# Patient Record
Sex: MALE | Race: WHITE | NOT HISPANIC OR LATINO | Employment: OTHER | URBAN - METROPOLITAN AREA
[De-identification: names, ages, dates, MRNs, and addresses within clinical notes are randomized per-mention and may not be internally consistent; named-entity substitution may affect disease eponyms.]

---

## 2019-01-23 ENCOUNTER — HISTORICAL (OUTPATIENT)
Dept: ADMINISTRATIVE | Facility: HOSPITAL | Age: 74
End: 2019-01-23

## 2019-02-27 ENCOUNTER — HISTORICAL (OUTPATIENT)
Dept: ADMINISTRATIVE | Facility: HOSPITAL | Age: 74
End: 2019-02-27

## 2019-08-20 ENCOUNTER — HISTORICAL (OUTPATIENT)
Dept: RADIOLOGY | Facility: HOSPITAL | Age: 74
End: 2019-08-20

## 2022-04-10 ENCOUNTER — HISTORICAL (OUTPATIENT)
Dept: ADMINISTRATIVE | Facility: HOSPITAL | Age: 77
End: 2022-04-10
Payer: MEDICARE

## 2022-04-26 VITALS
BODY MASS INDEX: 25.77 KG/M2 | OXYGEN SATURATION: 98 % | DIASTOLIC BLOOD PRESSURE: 67 MMHG | WEIGHT: 184.06 LBS | SYSTOLIC BLOOD PRESSURE: 101 MMHG | HEIGHT: 71 IN

## 2022-05-04 PROBLEM — L40.9 PSORIASIS: Status: ACTIVE | Noted: 2022-05-04

## 2022-05-04 PROBLEM — I83.90 VARICOSE VEINS OF LOWER EXTREMITY: Status: ACTIVE | Noted: 2022-05-04

## 2022-05-04 PROBLEM — E78.2 MIXED HYPERLIPIDEMIA: Status: ACTIVE | Noted: 2022-05-04

## 2022-05-04 PROBLEM — I10 PRIMARY HYPERTENSION: Status: ACTIVE | Noted: 2022-05-04

## 2022-05-04 PROBLEM — R73.03 PREDIABETES: Status: ACTIVE | Noted: 2022-05-04

## 2022-05-05 ENCOUNTER — OFFICE VISIT (OUTPATIENT)
Dept: PRIMARY CARE CLINIC | Facility: CLINIC | Age: 77
End: 2022-05-05
Payer: MEDICARE

## 2022-05-05 VITALS
OXYGEN SATURATION: 96 % | WEIGHT: 198.44 LBS | DIASTOLIC BLOOD PRESSURE: 72 MMHG | HEIGHT: 70 IN | BODY MASS INDEX: 28.41 KG/M2 | SYSTOLIC BLOOD PRESSURE: 112 MMHG | RESPIRATION RATE: 18 BRPM | HEART RATE: 90 BPM | TEMPERATURE: 97 F

## 2022-05-05 DIAGNOSIS — I10 PRIMARY HYPERTENSION: ICD-10-CM

## 2022-05-05 DIAGNOSIS — Z00.00 WELLNESS EXAMINATION: Primary | ICD-10-CM

## 2022-05-05 DIAGNOSIS — E78.2 MIXED HYPERLIPIDEMIA: ICD-10-CM

## 2022-05-05 DIAGNOSIS — Z12.5 SCREENING FOR PROSTATE CANCER: ICD-10-CM

## 2022-05-05 DIAGNOSIS — D72.819 LEUKOPENIA, UNSPECIFIED TYPE: ICD-10-CM

## 2022-05-05 DIAGNOSIS — R73.03 PREDIABETES: ICD-10-CM

## 2022-05-05 DIAGNOSIS — I83.90 VARICOSE VEINS OF LOWER EXTREMITY, UNSPECIFIED LATERALITY, UNSPECIFIED WHETHER COMPLICATED: ICD-10-CM

## 2022-05-05 DIAGNOSIS — L40.9 PSORIASIS: ICD-10-CM

## 2022-05-05 DIAGNOSIS — J01.00 ACUTE NON-RECURRENT MAXILLARY SINUSITIS: ICD-10-CM

## 2022-05-05 PROCEDURE — 99397 PER PM REEVAL EST PAT 65+ YR: CPT | Mod: GY,,, | Performed by: GENERAL PRACTICE

## 2022-05-05 PROCEDURE — 99397 PR PREVENTIVE VISIT,EST,65 & OVER: ICD-10-PCS | Mod: GY,,, | Performed by: GENERAL PRACTICE

## 2022-05-05 RX ORDER — PIOGLITAZONEHYDROCHLORIDE 30 MG/1
1 TABLET ORAL DAILY
COMMUNITY
Start: 2021-08-19 | End: 2022-05-09 | Stop reason: SDUPTHER

## 2022-05-05 RX ORDER — BIOTIN 10 MG
TABLET ORAL
COMMUNITY

## 2022-05-05 RX ORDER — BUSPIRONE HYDROCHLORIDE 10 MG/1
1 TABLET ORAL 2 TIMES DAILY
COMMUNITY
Start: 2021-12-22 | End: 2023-06-16 | Stop reason: SDUPTHER

## 2022-05-05 RX ORDER — TRIAMCINOLONE ACETONIDE 1 MG/G
CREAM TOPICAL 2 TIMES DAILY
Qty: 15 G | Refills: 2 | Status: SHIPPED | OUTPATIENT
Start: 2022-05-05

## 2022-05-05 RX ORDER — RIVAROXABAN 20 MG/1
TABLET, FILM COATED ORAL
COMMUNITY
Start: 2022-05-03

## 2022-05-05 RX ORDER — AA/PROT/LYSINE/METHIO/VIT C/B6 50-12.5 MG
200 TABLET ORAL
COMMUNITY

## 2022-05-05 RX ORDER — AZITHROMYCIN 250 MG/1
TABLET, FILM COATED ORAL
Qty: 6 TABLET | Refills: 0 | Status: SHIPPED | OUTPATIENT
Start: 2022-05-05 | End: 2023-06-20

## 2022-05-05 RX ORDER — MULTIVIT WITH IRON,MINERALS
TABLET ORAL
COMMUNITY

## 2022-05-05 RX ORDER — VALSARTAN 40 MG/1
40 TABLET ORAL
COMMUNITY
Start: 2021-10-06 | End: 2022-08-28 | Stop reason: SDUPTHER

## 2022-05-05 RX ORDER — EVOLOCUMAB 140 MG/ML
INJECTION, SOLUTION SUBCUTANEOUS
COMMUNITY
Start: 2022-03-07 | End: 2023-06-20

## 2022-05-05 NOTE — PROGRESS NOTES
Subjective:       Patient ID: Naveen Myles is a 76 y.o. male.    Chief Complaint: Annual Exam    HPI   Patient presents to the clinic today for wellness examination.  Current and past medical history reviewed.  Pertinent family and social history reviewed.    Colorectal cancer screening:  CRC screening review  Prostate CA screening:  Prostate CA screening reviewed  Vaccinations due:  All vaccinations due and/or desired have been addressed and given      No complaints today.  Review of Systems   Constitutional: Negative.  Negative for fatigue and fever.   HENT: Positive for rhinorrhea. Negative for sore throat and trouble swallowing.         Congestion, purulent nasal drainage   Eyes: Negative.  Negative for redness and visual disturbance.   Respiratory: Negative.  Negative for chest tightness and shortness of breath.    Cardiovascular: Negative.  Negative for chest pain.   Gastrointestinal: Negative.  Negative for abdominal pain, blood in stool and nausea.   Endocrine: Negative.  Negative for cold intolerance, heat intolerance and polyuria.   Genitourinary: Negative.    Musculoskeletal: Negative.  Negative for arthralgias, gait problem and joint swelling.   Integumentary:  Negative for rash. Negative.   Neurological: Negative.  Negative for dizziness, weakness and headaches.   Psychiatric/Behavioral: Negative.  Negative for agitation and dysphoric mood.         Objective:         Physical Exam  Constitutional:       Appearance: Normal appearance.   HENT:      Head: Normocephalic.      Comments: Maxillary sinus tenderness     Nose: Nose normal.      Mouth/Throat:      Pharynx: Oropharynx is clear.   Eyes:      Conjunctiva/sclera: Conjunctivae normal.   Cardiovascular:      Rate and Rhythm: Normal rate and regular rhythm.   Pulmonary:      Effort: Pulmonary effort is normal.      Breath sounds: Normal breath sounds.   Abdominal:      General: Abdomen is flat. Bowel sounds are normal.      Palpations: Abdomen is  soft.   Musculoskeletal:         General: Normal range of motion.      Cervical back: Neck supple.   Skin:     General: Skin is warm and dry.   Neurological:      General: No focal deficit present.      Mental Status: He is oriented to person, place, and time.   Psychiatric:         Mood and Affect: Mood normal.         Behavior: Behavior normal.         Assessment:       Problem List Items Addressed This Visit        Derm    Psoriasis       Cardiac/Vascular    Primary hypertension     Condition controlled, continue current medication/treatment plan           Mixed hyperlipidemia     Continue current management for hyperlipidemia, fasting lipid panel will be checked annually, more often if necessary.           Varicose veins of lower extremity       Oncology    Leukopenia       Endocrine    Prediabetes     Current A1c 6.3%. Follow a healthy meal plan.  This includes eating lean proteins, complex carbohydrates in moderation (rice, bread, pasta, potatoes), fresh fruits and vegetables, low-fat dairy products, and healthy fats such as avocado, olive, canola, or vegetable oral.  Simple carbohydrates such as sweets, containing sugar should be avoided or consumed controlled amount.s    Physical activity as recommended, 30 more minutes up to 5 days per week.  This could be brisk walking or biking.    Weight loss is also beneficial, and may reverse diabetes or prediabetes.             Other Visit Diagnoses     Wellness examination    -  Primary    Screening for prostate cancer        Acute non-recurrent maxillary sinusitis        Relevant Medications    azithromycin (ZITHROMAX Z-ALBA) 250 MG tablet          Plan:           Overall health status was reviewed.  Good health habits reinforced.  Cardiovascular disease risk factors discussed.  Appropriate recommendations and preventative care medical information provided, and annual wellness exams were encouraged.

## 2022-05-05 NOTE — ASSESSMENT & PLAN NOTE
Continue current management for hyperlipidemia, fasting lipid panel will be checked annually, more often if necessary.

## 2022-05-05 NOTE — ASSESSMENT & PLAN NOTE
Current A1c 6.3%. Follow a healthy meal plan.  This includes eating lean proteins, complex carbohydrates in moderation (rice, bread, pasta, potatoes), fresh fruits and vegetables, low-fat dairy products, and healthy fats such as avocado, olive, canola, or vegetable oral.  Simple carbohydrates such as sweets, containing sugar should be avoided or consumed controlled amount.s    Physical activity as recommended, 30 more minutes up to 5 days per week.  This could be brisk walking or biking.    Weight loss is also beneficial, and may reverse diabetes or prediabetes.

## 2022-05-09 DIAGNOSIS — E11.9 TYPE 2 DIABETES MELLITUS WITHOUT COMPLICATION, WITHOUT LONG-TERM CURRENT USE OF INSULIN: ICD-10-CM

## 2022-05-09 DIAGNOSIS — E11.9 TYPE 2 DIABETES MELLITUS WITHOUT COMPLICATION, WITHOUT LONG-TERM CURRENT USE OF INSULIN: Primary | ICD-10-CM

## 2022-05-09 RX ORDER — PIOGLITAZONEHYDROCHLORIDE 30 MG/1
30 TABLET ORAL DAILY
Qty: 90 TABLET | Refills: 3 | Status: SHIPPED | OUTPATIENT
Start: 2022-05-09 | End: 2023-06-20

## 2022-05-09 RX ORDER — PIOGLITAZONEHYDROCHLORIDE 30 MG/1
30 TABLET ORAL DAILY
Qty: 30 TABLET | Refills: 11 | Status: SHIPPED | OUTPATIENT
Start: 2022-05-09 | End: 2022-05-09

## 2022-05-09 NOTE — TELEPHONE ENCOUNTER
Type:  Patient Requesting Referral    Who Called:Tatsie  Does the patient already have the specialty appointment scheduled?:no  If yes, what is the date of that appointment?:na  Referral to What Specialty:Dermatolgy   Reason for Referral:n/a  Does the patient want the referral with a specific physician?:Dr. Reinoso  [facility]  Patient Requesting a Response?:yes  Would the patient rather a call back or a response via [facility]? Call back  Best Call Back Number:[phone]  Additional Information:     Type:  RX Refill Request    Who Called:Tatsialistair  Refill or New Rx:refill  RX Name and Strength:pioglitazone 30mg  How is the patient currently taking it? (ex. 1XDay):1x day  Is this a 30 day or 90 day RX:90day  Preferred Pharmacy with phone number:[facility]  Local or Mail Order:Mail order  Ordering Provider:na  Would the patient rather a call back or a response via [facility]? Call back  Best Call Back Number:[phone]  Additional Information:

## 2022-08-28 ENCOUNTER — TELEPHONE (OUTPATIENT)
Dept: PRIMARY CARE CLINIC | Facility: CLINIC | Age: 77
End: 2022-08-28
Payer: MEDICARE

## 2022-08-28 DIAGNOSIS — I10 PRIMARY HYPERTENSION: Primary | ICD-10-CM

## 2022-08-28 RX ORDER — VALSARTAN 40 MG/1
40 TABLET ORAL DAILY
Qty: 90 TABLET | Refills: 3 | Status: SHIPPED | OUTPATIENT
Start: 2022-08-28 | End: 2023-06-20 | Stop reason: SDUPTHER

## 2022-08-29 NOTE — TELEPHONE ENCOUNTER
----- Message from Elda Restrepo sent at 8/26/2022 11:10 AM CDT -----  Regarding: new rx  Type:  RX Refill Request    Who Called: patient's wife  Refill or New Rx:new rx  RX Name and Strength: Valsarton 40 mg  How is the patient currently taking it? (ex. 1XDay): 1xday  Is this a 30 day or 90 day RX: 90 day   Preferred Pharmacy with phone number: [facility] or Mail Order: mail order  Ordering Provider: Dr. Solares  Would the patient rather a call back or a response via [facility]?   Best Call Back Number:[phone]  Additional Information: Patient needs a new rx for this medication.

## 2023-05-22 ENCOUNTER — TELEPHONE (OUTPATIENT)
Dept: PRIMARY CARE CLINIC | Facility: CLINIC | Age: 78
End: 2023-05-22
Payer: MEDICARE

## 2023-05-22 DIAGNOSIS — Z12.5 SCREENING FOR PROSTATE CANCER: ICD-10-CM

## 2023-05-22 DIAGNOSIS — E78.2 MIXED HYPERLIPIDEMIA: ICD-10-CM

## 2023-05-22 DIAGNOSIS — R73.03 PREDIABETES: ICD-10-CM

## 2023-05-22 DIAGNOSIS — D72.819 LEUKOPENIA, UNSPECIFIED TYPE: ICD-10-CM

## 2023-05-22 DIAGNOSIS — Z00.00 WELLNESS EXAMINATION: Primary | ICD-10-CM

## 2023-05-22 DIAGNOSIS — R53.83 FATIGUE, UNSPECIFIED TYPE: ICD-10-CM

## 2023-05-22 NOTE — TELEPHONE ENCOUNTER
----- Message from Beverly Deng sent at 5/19/2023 11:31 AM CDT -----  Regarding: orders  .Caller is requesting to schedule their Lab appointment prior to annual appointment.  Order is not listed in EPIC.  Please enter order and contact patient to schedule.    Name of Caller:pt    Preferred Date and Time of Labs:5/25/23    Date of EPP Appointment:6/20/23    Where would they like the lab performed?[facility]    Would the patient rather a call back or a response via [facility]? Call back    Best Call Back Number:[phone]    Additional Information:pt is requesting orders for lab work

## 2023-05-22 NOTE — TELEPHONE ENCOUNTER
----- Message from Alba Valverde sent at 5/22/2023  7:31 AM CDT -----  Regarding: FW: orders  Spoke with Patient's wife and they will  lab orders at 1:15pm today. [facility]    Please order [facility] for this Patient.      ----- Message -----  From: Beverly Deng  Sent: 5/19/2023  11:34 AM CDT  To: Beck Hawkins Staff  Subject: orders                                           .Caller is requesting to schedule their Lab appointment prior to annual appointment.  Order is not listed in EPIC.  Please enter order and contact patient to schedule.    Name of Caller:pt    Preferred Date and Time of Labs:5/25/23    Date of EPP Appointment:6/20/23    Where would they like the lab performed?[facility]    Would the patient rather a call back or a response via [facility]? Call back    Best Call Back Number:[phone]    Additional Information:pt is requesting orders for lab work

## 2023-05-26 LAB
ALBUMIN SERPL-MCNC: 4.5 G/DL (ref 3.7–4.7)
ALBUMIN/GLOB SERPL: 2.1 {RATIO} (ref 1.2–2.2)
ALP SERPL-CCNC: 77 IU/L (ref 44–121)
ALT SERPL-CCNC: 24 IU/L (ref 0–44)
AST SERPL-CCNC: 24 IU/L (ref 0–40)
BASOPHILS # BLD AUTO: 0 X10E3/UL (ref 0–0.2)
BASOPHILS NFR BLD AUTO: 1 %
BILIRUB SERPL-MCNC: 0.5 MG/DL (ref 0–1.2)
BUN SERPL-MCNC: 23 MG/DL (ref 8–27)
BUN/CREAT SERPL: 21 (ref 10–24)
CALCIUM SERPL-MCNC: 9.7 MG/DL (ref 8.6–10.2)
CHLORIDE SERPL-SCNC: 101 MMOL/L (ref 96–106)
CHOLEST SERPL-MCNC: 257 MG/DL (ref 100–199)
CO2 SERPL-SCNC: 28 MMOL/L (ref 20–29)
CREAT SERPL-MCNC: 1.1 MG/DL (ref 0.76–1.27)
EOSINOPHIL # BLD AUTO: 0.1 X10E3/UL (ref 0–0.4)
EOSINOPHIL NFR BLD AUTO: 3 %
ERYTHROCYTE [DISTWIDTH] IN BLOOD BY AUTOMATED COUNT: 14.6 % (ref 11.6–15.4)
EST. AVERAGE GLUCOSE BLD GHB EST-MCNC: 134 MG/DL
EST. GFR  (NO RACE VARIABLE): 70 ML/MIN/1.73
GLOBULIN SER CALC-MCNC: 2.1 G/DL (ref 1.5–4.5)
GLUCOSE SERPL-MCNC: 117 MG/DL (ref 70–99)
HBA1C MFR BLD: 6.3 % (ref 4.8–5.6)
HCT VFR BLD AUTO: 46.6 % (ref 37.5–51)
HCV IGG SERPL QL IA: NON REACTIVE
HDLC SERPL-MCNC: 75 MG/DL
HGB BLD-MCNC: 15.1 G/DL (ref 13–17.7)
LDLC SERPL CALC-MCNC: 172 MG/DL (ref 0–99)
LYMPHOCYTES # BLD AUTO: 0.9 X10E3/UL (ref 0.7–3.1)
LYMPHOCYTES NFR BLD AUTO: 33 %
MCH RBC QN AUTO: 30.7 PG (ref 26.6–33)
MCHC RBC AUTO-ENTMCNC: 32.4 G/DL (ref 31.5–35.7)
MCV RBC AUTO: 95 FL (ref 79–97)
MONOCYTES # BLD AUTO: 0.3 X10E3/UL (ref 0.1–0.9)
MONOCYTES NFR BLD AUTO: 10 %
NEUTROPHILS # BLD AUTO: 1.4 X10E3/UL (ref 1.4–7)
NEUTROPHILS NFR BLD AUTO: 53 %
PLATELET # BLD AUTO: 115 X10E3/UL (ref 150–450)
POTASSIUM SERPL-SCNC: 4.8 MMOL/L (ref 3.5–5.2)
PROT SERPL-MCNC: 6.6 G/DL (ref 6–8.5)
PSA SERPL-MCNC: 0.2 NG/ML (ref 0–4)
RBC # BLD AUTO: 4.92 X10E6/UL (ref 4.14–5.8)
SODIUM SERPL-SCNC: 141 MMOL/L (ref 134–144)
SPECIMEN STATUS REPORT: NORMAL
TRIGL SERPL-MCNC: 61 MG/DL (ref 0–149)
TSH SERPL DL<=0.005 MIU/L-ACNC: 1.82 UIU/ML (ref 0.45–4.5)
VLDLC SERPL CALC-MCNC: 10 MG/DL (ref 5–40)
WBC # BLD AUTO: 2.6 X10E3/UL (ref 3.4–10.8)

## 2023-06-16 ENCOUNTER — PATIENT OUTREACH (OUTPATIENT)
Dept: ADMINISTRATIVE | Facility: HOSPITAL | Age: 78
End: 2023-06-16
Payer: MEDICARE

## 2023-06-16 ENCOUNTER — TELEPHONE (OUTPATIENT)
Dept: PRIMARY CARE CLINIC | Facility: CLINIC | Age: 78
End: 2023-06-16
Payer: MEDICARE

## 2023-06-16 DIAGNOSIS — R53.83 FATIGUE, UNSPECIFIED TYPE: Primary | ICD-10-CM

## 2023-06-16 RX ORDER — BUSPIRONE HYDROCHLORIDE 10 MG/1
10 TABLET ORAL 2 TIMES DAILY
Qty: 90 TABLET | Refills: 3 | Status: SHIPPED | OUTPATIENT
Start: 2023-06-16 | End: 2023-06-20 | Stop reason: SDUPTHER

## 2023-06-16 NOTE — TELEPHONE ENCOUNTER
----- Message from [facility] sent at 6/16/2023 10:36 AM CDT -----  Regarding: refill  .Type:  RX Refill Request    Who Called: [facility]    Refill or New Rx: busPIRone (BUSPAR) 10 MG tablet      How is the patient currently taking it? (ex. 1XDay): one day    Is this a 30 day or 90 day RX: 90    Preferred Pharmacy with phone number: [phone]    Local or Mail Order: mail    Ordering Provider: Ross Zhao Call Back Number: [phone]    Additional Information:  refill

## 2023-06-16 NOTE — LETTER
"       AUTHORIZATION FOR RELEASE OF   CONFIDENTIAL INFORMATION    Dear Staff/Althea BONDS ,    We are seeing Naveen Myles, date of birth 1945, in the clinic at [facility]. Ross Solares MD is the patient's PCP. Naveen Myles has an outstanding lab/procedure at the time we reviewed his chart. In order to help keep his health information updated, he has authorized us to request the following medical record(s):        (  )  MAMMOGRAM                                      ( xx )  COLONOSCOPY/Path       (  )  PAP SMEAR                                          (  )  OUTSIDE LAB RESULTS     (  )  DEXA SCAN                                          (  )  EYE EXAM            (  )  FOOT EXAM                                          (  )  ENTIRE RECORD     (  )  OUTSIDE IMMUNIZATIONS                 (  )  _______________         Please fax records to [facility], Pernell J Simon, MD,  [phone]      If you have any questions, please contact Sandy Gilbert (Connie)Care Coordinator @ [phone]      Patient Name: Naveen Myles  : 1945  Patient Phone #: [phone]     "

## 2023-06-19 ENCOUNTER — PATIENT OUTREACH (OUTPATIENT)
Dept: ADMINISTRATIVE | Facility: HOSPITAL | Age: 78
End: 2023-06-19
Payer: MEDICARE

## 2023-06-19 PROBLEM — I10 PRIMARY HYPERTENSION: Chronic | Status: ACTIVE | Noted: 2022-05-04

## 2023-06-19 PROBLEM — N40.0 BENIGN PROSTATIC HYPERPLASIA: Status: ACTIVE | Noted: 2023-06-19

## 2023-06-19 PROBLEM — I48.91 ATRIAL FIBRILLATION: Status: ACTIVE | Noted: 2019-09-24

## 2023-06-19 PROBLEM — M79.10 MYALGIA DUE TO STATIN: Status: ACTIVE | Noted: 2020-08-13

## 2023-06-19 PROBLEM — T46.6X5A MYALGIA DUE TO STATIN: Status: ACTIVE | Noted: 2020-08-13

## 2023-06-19 PROBLEM — E66.9 OBESITY: Status: ACTIVE | Noted: 2023-06-19

## 2023-06-19 PROBLEM — R73.03 PREDIABETES: Chronic | Status: ACTIVE | Noted: 2022-05-04

## 2023-06-19 PROBLEM — E78.5 DYSLIPIDEMIA: Chronic | Status: ACTIVE | Noted: 2023-06-19

## 2023-06-19 PROBLEM — M76.60 ACHILLES TENDINITIS: Status: ACTIVE | Noted: 2023-06-19

## 2023-06-19 NOTE — LETTER
"  This communication is flagged as high priority.        AUTHORIZATION FOR RELEASE OF   CONFIDENTIAL INFORMATION    Dear Staff/Hawk Dumont MD,    We are seeing Naveen Myles, date of birth 1945, in the clinic at [facility]. Ross Solares MD is the patient's PCP. Naveen Myles has an outstanding lab/procedure at the time we reviewed his chart. In order to help keep his health information updated, he has authorized us to request the following medical record(s):        (  )  MAMMOGRAM                                      ( xx )  COLONOSCOPY      (  )  PAP SMEAR                                          (  )  OUTSIDE LAB RESULTS     (  )  DEXA SCAN                                          (  )  EYE EXAM            (  )  FOOT EXAM                                          (  )  ENTIRE RECORD     (  )  OUTSIDE IMMUNIZATIONS                 (  )  _______________         Please fax records to [facility], Pernell J Simon, MD,  [phone]      If you have any questions, please contact Sandy Gilbert (Connie)Care Coordinator @ [phone]     Patient Name: Naveen Myles  : 1945  Patient Phone #: [phone]     "

## 2023-06-20 ENCOUNTER — OFFICE VISIT (OUTPATIENT)
Dept: PRIMARY CARE CLINIC | Facility: CLINIC | Age: 78
End: 2023-06-20
Payer: MEDICARE

## 2023-06-20 VITALS
BODY MASS INDEX: 26.92 KG/M2 | RESPIRATION RATE: 18 BRPM | HEART RATE: 75 BPM | OXYGEN SATURATION: 99 % | SYSTOLIC BLOOD PRESSURE: 115 MMHG | HEIGHT: 70 IN | DIASTOLIC BLOOD PRESSURE: 71 MMHG | WEIGHT: 188 LBS

## 2023-06-20 DIAGNOSIS — I10 PRIMARY HYPERTENSION: Chronic | ICD-10-CM

## 2023-06-20 DIAGNOSIS — T46.6X5A MYALGIA DUE TO STATIN: Chronic | ICD-10-CM

## 2023-06-20 DIAGNOSIS — I48.11 LONGSTANDING PERSISTENT ATRIAL FIBRILLATION: Chronic | ICD-10-CM

## 2023-06-20 DIAGNOSIS — R53.83 FATIGUE, UNSPECIFIED TYPE: ICD-10-CM

## 2023-06-20 DIAGNOSIS — M79.10 MYALGIA DUE TO STATIN: Chronic | ICD-10-CM

## 2023-06-20 DIAGNOSIS — Z12.5 SCREENING FOR PROSTATE CANCER: ICD-10-CM

## 2023-06-20 DIAGNOSIS — D69.6 THROMBOCYTOPENIA: Chronic | ICD-10-CM

## 2023-06-20 DIAGNOSIS — Z00.00 MEDICARE ANNUAL WELLNESS VISIT, SUBSEQUENT: Primary | ICD-10-CM

## 2023-06-20 DIAGNOSIS — D70.0 CONGENITAL NEUTROPENIA: Chronic | ICD-10-CM

## 2023-06-20 DIAGNOSIS — E78.5 DYSLIPIDEMIA: Chronic | ICD-10-CM

## 2023-06-20 DIAGNOSIS — E66.9 OBESITY, UNSPECIFIED CLASSIFICATION, UNSPECIFIED OBESITY TYPE, UNSPECIFIED WHETHER SERIOUS COMORBIDITY PRESENT: ICD-10-CM

## 2023-06-20 DIAGNOSIS — F41.1 GAD (GENERALIZED ANXIETY DISORDER): ICD-10-CM

## 2023-06-20 DIAGNOSIS — R73.03 PREDIABETES: Chronic | ICD-10-CM

## 2023-06-20 PROBLEM — I48.91 ATRIAL FIBRILLATION: Chronic | Status: ACTIVE | Noted: 2019-09-24

## 2023-06-20 PROCEDURE — 3074F SYST BP LT 130 MM HG: CPT | Mod: CPTII,,, | Performed by: GENERAL PRACTICE

## 2023-06-20 PROCEDURE — 3074F PR MOST RECENT SYSTOLIC BLOOD PRESSURE < 130 MM HG: ICD-10-PCS | Mod: CPTII,,, | Performed by: GENERAL PRACTICE

## 2023-06-20 PROCEDURE — G0439 PPPS, SUBSEQ VISIT: HCPCS | Mod: ,,, | Performed by: GENERAL PRACTICE

## 2023-06-20 PROCEDURE — 3078F PR MOST RECENT DIASTOLIC BLOOD PRESSURE < 80 MM HG: ICD-10-PCS | Mod: CPTII,,, | Performed by: GENERAL PRACTICE

## 2023-06-20 PROCEDURE — 3044F PR MOST RECENT HEMOGLOBIN A1C LEVEL <7.0%: ICD-10-PCS | Mod: CPTII,,, | Performed by: GENERAL PRACTICE

## 2023-06-20 PROCEDURE — 3044F HG A1C LEVEL LT 7.0%: CPT | Mod: CPTII,,, | Performed by: GENERAL PRACTICE

## 2023-06-20 PROCEDURE — 1160F PR REVIEW ALL MEDS BY PRESCRIBER/CLIN PHARMACIST DOCUMENTED: ICD-10-PCS | Mod: CPTII,,, | Performed by: GENERAL PRACTICE

## 2023-06-20 PROCEDURE — 1159F PR MEDICATION LIST DOCUMENTED IN MEDICAL RECORD: ICD-10-PCS | Mod: CPTII,,, | Performed by: GENERAL PRACTICE

## 2023-06-20 PROCEDURE — 1160F RVW MEDS BY RX/DR IN RCRD: CPT | Mod: CPTII,,, | Performed by: GENERAL PRACTICE

## 2023-06-20 PROCEDURE — G0439 PR MEDICARE ANNUAL WELLNESS SUBSEQUENT VISIT: ICD-10-PCS | Mod: ,,, | Performed by: GENERAL PRACTICE

## 2023-06-20 PROCEDURE — 1159F MED LIST DOCD IN RCRD: CPT | Mod: CPTII,,, | Performed by: GENERAL PRACTICE

## 2023-06-20 PROCEDURE — 3078F DIAST BP <80 MM HG: CPT | Mod: CPTII,,, | Performed by: GENERAL PRACTICE

## 2023-06-20 RX ORDER — VALSARTAN 40 MG/1
40 TABLET ORAL DAILY
Qty: 90 TABLET | Refills: 3 | Status: SHIPPED | OUTPATIENT
Start: 2023-06-20 | End: 2023-12-20

## 2023-06-20 RX ORDER — UBIDECARENONE 75 MG
1 CAPSULE ORAL EVERY MORNING
COMMUNITY

## 2023-06-20 RX ORDER — PIOGLITAZONEHYDROCHLORIDE 30 MG/1
30 TABLET ORAL DAILY
Qty: 90 TABLET | Refills: 3 | Status: CANCELLED | OUTPATIENT
Start: 2023-06-20 | End: 2024-06-14

## 2023-06-20 RX ORDER — WARFARIN 6 MG/1
TABLET ORAL
COMMUNITY
Start: 2023-06-02

## 2023-06-20 RX ORDER — BUSPIRONE HYDROCHLORIDE 10 MG/1
10 TABLET ORAL 2 TIMES DAILY
Qty: 180 TABLET | Refills: 3 | Status: SHIPPED | OUTPATIENT
Start: 2023-06-20

## 2023-06-20 NOTE — ASSESSMENT & PLAN NOTE
Current WBC count 2.6 K, previous count 3000.     Component Ref Range & Units 3 wk ago  (5/25/23) 2 yr ago  (12/15/20) 2 yr ago  (12/9/20)   WBC 3.4 - 10.8 x10E3/uL 2.6 Low    3.0 Low  R    RBC 4.14 - 5.80 x10E6/uL 4.92   4.66 Low  R    Hemoglobin 13.0 - 17.7 g/dL 15.1  13.5 Low  R  14.3 R    Hematocrit 37.5 - 51.0 % 46.6  41.0 Low  R  44.2 R    MCV 79 - 97 fL 95   94.8 High  R    MCH 26.6 - 33.0 pg 30.7   30.7 R    MCHC 31.5 - 35.7 g/dL 32.4   32.4 Low  R    RDW 11.6 - 15.4 % 14.6   14.6 R    Platelets 150 - 450 x10E3/uL 115 Low    111 Low  R    Neutrophils Not Estab. % 53      Lymphs Not Estab. % 33      Monocytes Not Estab. % 10      Eos Not Estab. % 3      Basos Not Estab. 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

## 2023-06-20 NOTE — ASSESSMENT & PLAN NOTE
Patient prescribed BuSpar 10 mg b.i.d..  Patient reports stability of moods, and effectiveness of medications, including no agitation, significant somnolence, or significant bouts of anxiety or depression.  Patient is not having any sleep disturbance.  Current treatment plan will continue, with plans to discuss any significant changes in patient's status if necessary if anything changes in the future.

## 2023-06-20 NOTE — ASSESSMENT & PLAN NOTE
Component Ref Range & Units 3 wk ago  (5/25/23) 2 yr ago  (12/15/20) 2 yr ago  (12/9/20)   WBC 3.4 - 10.8 x10E3/uL 2.6 Low    3.0 Low  R    RBC 4.14 - 5.80 x10E6/uL 4.92   4.66 Low  R    Hemoglobin 13.0 - 17.7 g/dL 15.1  13.5 Low  R  14.3 R    Hematocrit 37.5 - 51.0 % 46.6  41.0 Low  R  44.2 R    MCV 79 - 97 fL 95   94.8 High  R    MCH 26.6 - 33.0 pg 30.7   30.7 R    MCHC 31.5 - 35.7 g/dL 32.4   32.4 Low  R    RDW 11.6 - 15.4 % 14.6   14.6 R    Platelets 150 - 450 x10E3/uL 115 Low    111 Low  R    Neutrophils Not Estab. % 53      Lymphs Not Estab. % 33      Monocytes Not Estab. % 10      Eos Not Estab. % 3      Basos Not Estab. % 1      Neutrophils (Absolute) 1.4 - 7.0 x10E3/uL 1.4      Lymphs (Absolute) 0.7 - 3.1 x10E3/uL 0.9      Monocytes(Absolute) 0.1 - 0.9 x10E3/uL 0.3      Eos (Absolute) 0.0 - 0.4 x10E3/uL 0.1      Baso (Absolute) 0.0 - 0.2 x10E3/uL 0.0      MPV    13.2 High  R      Current platelet count 115 K. this is mildly low, we will continue to monitor annually, condition does appear to be stable overall.

## 2023-06-20 NOTE — ASSESSMENT & PLAN NOTE
Intolerant to statin with muscle pain, continues to have dyslipidemia, managed by his cardiologist.

## 2023-06-20 NOTE — PROGRESS NOTES
Patient ID: [id number]     Chief Complaint: Annual Exam      HPI:     Naveen Myles is a 77 y.o. male here today for a Medicare Wellness. No other complaints today.       ----------------------------  Diabetes mellitus, type 2  Hyperlipidemia  Hypertension     Past Surgical History:   Procedure Laterality Date    HAND SURGERY         Review of patient's allergies indicates:   Allergen Reactions    Penicillins Hives     Other reaction(s): hives    Statins-hmg-coa reductase inhibitors      myalgia       No outpatient medications have been marked as taking for the 6/20/23 encounter (Office Visit) with Ross Solares MD.       Social History     Socioeconomic History    Marital status:    Tobacco Use    Smoking status: Never    Smokeless tobacco: Never   Substance and Sexual Activity    Alcohol use: Not Currently    Drug use: Never    Sexual activity: Yes        History reviewed. No pertinent family history.     Patient Care Team:  Ross Solares MD as PCP - General (Family Medicine)  Reid Sen MD as Consulting Physician (Cardiology)     Opioid Screening: Patient medication list reviewed, patient is not taking prescription opioids. Patient is not using additional opioids than prescribed. Patient is at low risk of substance abuse based on this opioid use history.       Subjective:     Review of Systems   Constitutional: Negative.  Negative for malaise/fatigue and weight loss.   HENT: Negative.     Eyes: Negative.    Respiratory: Negative.  Negative for shortness of breath.    Cardiovascular: Negative.  Negative for chest pain.   Gastrointestinal: Negative.    Genitourinary: Negative.    Musculoskeletal: Negative.    Skin: Negative.    Neurological: Negative.  Negative for focal weakness, weakness and headaches.   Endo/Heme/Allergies: Negative.    Psychiatric/Behavioral: Negative.  Negative for depression and memory loss. The patient is not nervous/anxious.        Patient Reported Health Risk  Assessment  What [age] 70-79  Are you male or female?: Male  During the past four weeks, how much have you been bothered by emotional problems such as feeling anxious, depressed, irritable, sad, or downhearted and blue?: Not at all  During the past five weeks, has your physical and/or emotional health limited your social activities with family, friends, neighbors, or groups?: Not at all  During the past four weeks, how much bodily pain have you generally had?: No pain  During the past four weeks, was someone available to help if you needed and wanted help?: Yes, as much as I wanted  During the past four weeks, what was the hardest physical activity you could do for at least two minutes?: Light  Can you get to places out of walking distance without help?  (For example, can you travel alone on buses or taxis, or drive your own car?): Yes  Can you go shopping for groceries or clothes without someone's help?: Yes  Can you prepare your own meals?: Yes  Can you do your own housework without help?: Yes  Because of any health problems, do you need the help of another person with your personal care needs such as eating, bathing, dressing, or getting around the house?: No  Can you handle your own money without help?: Yes  During the past four weeks, how would you rate your health in general?: Good  How have things been going for you during the past four weeks?: Pretty well  Are you having difficulties driving your car?: No  Do you always fasten your seat belt when you are in a car?: Yes, usually  How often in the past four weeks have you been bothered by falling or dizzy when standing up?: Never  How often in the past four weeks have you been bothered by sexual problems?: Never  How often in the past four weeks have you been bothered by trouble eating well?: Never  How often in the past four weeks have you been bothered by teeth or denture problems?: Never  How often in the past four weeks have you been bothered with  "problems using the telephone?: Never  How often in the past four weeks have you been bothered by tiredness or fatigue?: Never  Have you fallen two or more times in the past year?: No  Are you afraid of falling?: No  Are you a smoker?: No  During the past four weeks, how many drinks of wine, beer, or other alcoholic beverages did you have?: No alcohol at all  Do you exercise for about 20 minutes three or more days a week?: No, I usually do not exercise this much  Have you been given any information to help you with hazards in your house that might hurt you?: No  Have you been given any information to help you with keeping track of your medications?: No  How often do you have trouble taking medicines the way you've been told to take them?: I always take them as prescribed  How confident are you that you can control and manage most of your health problems?: Very confident  What is your race? (Check all that apply.):     Objective:     /71   Pulse 75   Resp 18   Ht 5' 10" (1.778 m)   Wt 85.3 kg (188 lb)   SpO2 99%   BMI 26.98 kg/m²     Physical Exam  Constitutional:       Appearance: Normal appearance.   HENT:      Head: Normocephalic.      Mouth/Throat:      Pharynx: Oropharynx is clear.   Eyes:      Conjunctiva/sclera: Conjunctivae normal.      Pupils: Pupils are equal, round, and reactive to light.   Cardiovascular:      Rate and Rhythm: Normal rate and regular rhythm.      Heart sounds: Normal heart sounds.   Pulmonary:      Effort: Pulmonary effort is normal.      Breath sounds: Normal breath sounds.   Abdominal:      General: Abdomen is flat.      Palpations: Abdomen is soft.   Musculoskeletal:         General: Normal range of motion.      Cervical back: Neck supple.   Skin:     General: Skin is warm and dry.   Neurological:      General: No focal deficit present.      Mental Status: He is oriented to person, place, and time.   Psychiatric:         Mood and Affect: Mood normal.         " Behavior: Behavior normal.         Thought Content: Thought content normal.         Judgment: Judgment normal.       Lab Results   Component Value Date     (H) 05/25/2023     05/25/2023    K 4.8 05/25/2023     05/25/2023    CO2 28 05/25/2023    BUN 23 05/25/2023    CREATININE 1.10 05/25/2023    CALCIUM 9.7 05/25/2023    ALBUMIN 4.5 05/25/2023    BILITOT 0.5 05/25/2023    AST 24 05/25/2023    ALT 24 05/25/2023    EGFRNORACEVR 70 05/25/2023     Lab Results   Component Value Date    WBC 2.6 (L) 05/25/2023    RBC 4.92 05/25/2023    HGB 15.1 05/25/2023    HCT 46.6 05/25/2023    MCV 95 05/25/2023    RDW 14.6 05/25/2023     (L) 05/25/2023      Lab Results   Component Value Date    CHOL 257 (H) 05/25/2023    TRIG 61 05/25/2023    HDL 75 05/25/2023     Lab Results   Component Value Date    TSH 1.820 05/25/2023     Lab Results   Component Value Date    HGBA1C 6.3 (H) 05/25/2023    ESTIMATEDAVG 134 05/25/2023        No results found for: PSA      No flowsheet data found.  Fall Risk Assessment - Outpatient 5/5/2022   Mobility Status Ambulatory   Number of falls 0   Identified as fall risk 0           Depression Screening  Over the past two weeks, has the patient felt down, depressed, or hopeless?: No  Over the past two weeks, has the patient felt little interest or pleasure in doing things?: No  Functional Ability/Safety Screening  Was the patient's timed Up & Go test unsteady or longer than 30 seconds?: No  Does the patient need help with phone, transportation, shopping, preparing meals, housework, laundry, meds, or managing money?: No  Does the patient's home have rugs in the hallway, lack grab bars in the bathroom, lack handrails on the stairs or have poor lighting?: No  Have you noticed any hearing difficulties?: No  Cognitive Function (Assessed through direct observation with due consideration of information obtained by way of patient reports and/or concerns raised by family, friends, caretakers,  or others)    Does the patient repeat questions/statements in the same day?: No  Does the patient have trouble remembering the date, year, and time?: No  Does the patient have difficulty managing finances?: No  Assessment:       ICD-10-CM ICD-9-CM   1. Medicare annual wellness visit, subsequent  Z00.00 V70.0   2. Screening for prostate cancer  Z12.5 V76.44   3. Primary hypertension  I10 401.9   4. Dyslipidemia  E78.5 272.4   5. Congenital neutropenia  D70.0 288.01   6. Thrombocytopenia  D69.6 287.5   7. Prediabetes  R73.03 790.29   8. Obesity, unspecified classification, unspecified obesity type, unspecified whether serious comorbidity present  E66.9 278.00   9. Fatigue, unspecified type  R53.83 780.79   10. DARRELL (generalized anxiety disorder)  F41.1 300.02   11. Longstanding persistent atrial fibrillation  I48.11 427.31   12. Myalgia due to statin  M79.10 729.1    T46.6X5A E942.2        Plan:     Medicare Annual Wellness and Personalized Prevention Plan:   Fall Risk + Home Safety + Hearing Impairment + Depression Screen + Cognitive Impairment Screen + Health Risk Assessment all reviewed.       Health Maintenance Topics with due status: Not Due       Topic Last Completion Date    Influenza Vaccine 08/22/2021    Hemoglobin A1c (Prediabetes) 05/25/2023    Lipid Panel 05/25/2023      The patient's Health Maintenance was reviewed and the following appears to be due at this time:   There are no preventive care reminders to display for this patient.        1. Medicare annual wellness visit, subsequent  Comments:  Overall health status was reviewed.  Good health habits reinforced.  Annual wellness exams recommended.    2. Screening for prostate cancer  Comments:    Prostate specific antigen blood test obtained was essentially normal, suggesting that there is no current concern for prostate cancer.  Digital exam deferred.    3. Primary hypertension  Assessment & Plan:  Prescribed valsartan 40 mg daily.  Blood pressures  appear to be adequately controlled with current treatment plan, including prescription blood pressure medication. Continue medical management and continue home blood pressure checks.  Blood pressure should be checked as discussed during medical visits, and average blood pressure should be no greater than 130/80.    Orders:  -     valsartan (DIOVAN) 40 MG tablet; Take 1 tablet (40 mg total) by mouth once daily.  Dispense: 90 tablet; Refill: 3    4. Dyslipidemia  Assessment & Plan:  Component Ref Range & Units 3 wk ago   Cholesterol 100 - 199 mg/dL 257 High     Triglycerides 0 - 149 mg/dL 61    HDL >39 mg/dL 75    VLDL Cholesterol Miguel 5 - 40 mg/dL 10    LDL Calculated 0 - 99 mg/dL 172 High         Initially on Repatha, could not afford it, currently takes Omega three, not sure if that is the only medication he is taking but his lipids continue to look moderately controlled.  We will continue to monitor annually.        5. Congenital neutropenia  Assessment & Plan:  Current WBC count 2.6 K, previous count 3000.     Component Ref Range & Units 3 wk ago  (5/25/23) 2 yr ago  (12/15/20) 2 yr ago  (12/9/20)   WBC 3.4 - 10.8 x10E3/uL 2.6 Low    3.0 Low  R    RBC 4.14 - 5.80 x10E6/uL 4.92   4.66 Low  R    Hemoglobin 13.0 - 17.7 g/dL 15.1  13.5 Low  R  14.3 R    Hematocrit 37.5 - 51.0 % 46.6  41.0 Low  R  44.2 R    MCV 79 - 97 fL 95   94.8 High  R    MCH 26.6 - 33.0 pg 30.7   30.7 R    MCHC 31.5 - 35.7 g/dL 32.4   32.4 Low  R    RDW 11.6 - 15.4 % 14.6   14.6 R    Platelets 150 - 450 x10E3/uL 115 Low    111 Low  R    Neutrophils Not Estab. % 53      Lymphs Not Estab. % 33      Monocytes Not Estab. % 10      Eos Not Estab. % 3      Basos Not Estab. 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      6. Thrombocytopenia  Assessment & Plan:  Component Ref Range & Units 3 wk ago  (5/25/23) 2 yr ago  (12/15/20) 2 yr ago  (12/9/20)   WBC 3.4 - 10.8 x10E3/uL 2.6 Low    3.0 Low  R    RBC 4.14 - 5.80 x10E6/uL 4.92   4.66 Low  R    Hemoglobin 13.0 - 17.7 g/dL 15.1  13.5 Low  R  14.3 R    Hematocrit 37.5 - 51.0 % 46.6  41.0 Low  R  44.2 R    MCV 79 - 97 fL 95   94.8 High  R    MCH 26.6 - 33.0 pg 30.7   30.7 R    MCHC 31.5 - 35.7 g/dL 32.4   32.4 Low  R    RDW 11.6 - 15.4 % 14.6   14.6 R    Platelets 150 - 450 x10E3/uL 115 Low    111 Low  R    Neutrophils Not Estab. % 53      Lymphs Not Estab. % 33      Monocytes Not Estab. % 10      Eos Not Estab. % 3      Basos Not Estab. % 1      Neutrophils (Absolute) 1.4 - 7.0 x10E3/uL 1.4      Lymphs (Absolute) 0.7 - 3.1 x10E3/uL 0.9      Monocytes(Absolute) 0.1 - 0.9 x10E3/uL 0.3      Eos (Absolute) 0.0 - 0.4 x10E3/uL 0.1      Baso (Absolute) 0.0 - 0.2 x10E3/uL 0.0      MPV    13.2 High  R      Current platelet count 115 K. this is mildly low, we will continue to monitor annually, condition does appear to be stable overall.      7. Prediabetes  Assessment & Plan:    Prescribed Actos 30 mg a day.  He does have a history of cardiovascular issues, specifically chronic atrial fibrillation.  Quite active, no diagnosis of CHF.  I would like to see what his A1c is off the Actos, we will discontinue that medication today, and recheck his A1c in six months, restarting medication if it is over 7%.    Component Ref Range & Units 3 wk ago   Hemoglobin A1c 4.8 - 5.6 % 6.3 High     Comment:          Prediabetes: 5.7 - 6.4            Diabetes: >6.4            Glycemic control for adults with diabetes: <7.0    Estimated Avg Glucose mg/dL 134      Current A1c 6.3%.  Most recent hemoglobin A1c continues to be in the prediabetes range.  Continue current treatment plan diet, lifestyle modification.  While it is less  "than optimal for blood sugars to remain in the prediabetes range, it is essential that blood sugars do not rise to the point of becoming a type II diabetic.  We will continue to monitor closely.    Orders:  -     Hemoglobin A1C; Future; Expected date: 12/20/2023    8. Obesity, unspecified classification, unspecified obesity type, unspecified whether serious comorbidity present  Assessment & Plan:  Weight loss, healthy dietary choices, increased activity/exercise are recommended.  To lose weight, it is generally recommended to restrict calories to approximately 1500 calories per day to lose 1 lb per week, and approximately 1200 calories per day to lose up to 2 lb per week.  Generally, this is a healthy amount of weight to lose, and an appropriate amount of time to lose this amount of weight.  Adding exercise will assist in losing weight, particularly aerobic exercise such as walking.  However, resistance training, or lifting weights" over time may assistant weight loss by increasing basal metabolic rate, or the rate at which your body burns calories during periods of inactivity.    Keep track of BMI (body mass index), below 25 is considered normal, over 25 but below 30 is considered overweight, anything over 30 is considered moderately obese, over 35 severely obese, and over 40 is characterized as morbidly obese.      9. Fatigue, unspecified type    10. DARRELL (generalized anxiety disorder)  Assessment & Plan:   Prescribed buspirone 10 mg b.i.d..  Patient reports stability of moods, and effectiveness of medications, including no agitation, significant somnolence, or significant bouts of anxiety or depression.  Patient is not having any sleep disturbance.  Current treatment plan will continue, with plans to discuss any significant changes in patient's status if necessary if anything changes in the future.    Orders:  -     busPIRone (BUSPAR) 10 MG tablet; Take 1 tablet (10 mg total) by mouth 2 (two) times daily.  " Dispense: 180 tablet; Refill: 3    11. Longstanding persistent atrial fibrillation  Assessment & Plan:    Patient in atrial fibrillation, is in chronic Afib, controlled rate.      12. Myalgia due to statin  Assessment & Plan:    Intolerant to statin with muscle pain, continues to have dyslipidemia, managed by his cardiologist.           Advance Care Planning   I attest to discussing Advance Care Planning with patient and/or family member.  Education was provided including the importance of the Health Care Power of , Advance Directives, and/or [place] documentation.  The patient expressed understanding to the importance of this information and discussion.         Current Outpatient Medications   Medication Instructions    ASCORBATE CALCIUM, VITAMIN C, ORAL Oral    biotin 10 mg Tab Oral    busPIRone (BUSPAR) 10 mg, Oral, 2 times daily    coenzyme Q10 200 mg, Oral    cyanocobalamin 500 MCG tablet 1 tablet, Oral, Every morning    FIBER, HERBAL, ORAL Oral    multivitamin capsule 1 capsule, Oral, Daily    omega 3-dha-epa-fish oil 1,600-500-800 mg/5 mL Liqd Oral    potassium gluconate 2.5 mEq Tab Oral    triamcinolone acetonide 0.1% (KENALOG) 0.1 % cream Topical (Top), 2 times daily    valsartan (DIOVAN) 40 mg, Oral, Daily    vitamin E 100 Units, Oral    warfarin (COUMADIN) 6 MG tablet Oral    XARELTO 20 mg Tab No dose, route, or frequency recorded.        Follow up in about 6 months (around 12/20/2023) for Prediabetes/Chronic condition F/up. In addition to their scheduled follow up, the patient has also been instructed to follow up on as needed basis.

## 2023-06-20 NOTE — ASSESSMENT & PLAN NOTE
Prescribed valsartan 40 mg daily.  Blood pressures appear to be adequately controlled with current treatment plan, including prescription blood pressure medication. Continue medical management and continue home blood pressure checks.  Blood pressure should be checked as discussed during medical visits, and average blood pressure should be no greater than 130/80.

## 2023-06-20 NOTE — ASSESSMENT & PLAN NOTE
Prescribed buspirone 10 mg b.i.d..  Patient reports stability of moods, and effectiveness of medications, including no agitation, significant somnolence, or significant bouts of anxiety or depression.  Patient is not having any sleep disturbance.  Current treatment plan will continue, with plans to discuss any significant changes in patient's status if necessary if anything changes in the future.

## 2023-06-20 NOTE — ASSESSMENT & PLAN NOTE
Prescribed Actos 30 mg a day.  He does have a history of cardiovascular issues, specifically chronic atrial fibrillation.  Quite active, no diagnosis of CHF.  I would like to see what his A1c is off the Actos, we will discontinue that medication today, and recheck his A1c in six months, restarting medication if it is over 7%.    Component Ref Range & Units 3 wk ago   Hemoglobin A1c 4.8 - 5.6 % 6.3 High     Comment:          Prediabetes: 5.7 - 6.4            Diabetes: >6.4            Glycemic control for adults with diabetes: <7.0    Estimated Avg Glucose mg/dL 134      Current A1c 6.3%.  Most recent hemoglobin A1c continues to be in the prediabetes range.  Continue current treatment plan diet, lifestyle modification.  While it is less than optimal for blood sugars to remain in the prediabetes range, it is essential that blood sugars do not rise to the point of becoming a type II diabetic.  We will continue to monitor closely.

## 2023-06-20 NOTE — ASSESSMENT & PLAN NOTE
Component Ref Range & Units 3 wk ago   Cholesterol 100 - 199 mg/dL 257 High     Triglycerides 0 - 149 mg/dL 61    HDL >39 mg/dL 75    VLDL Cholesterol Miguel 5 - 40 mg/dL 10    LDL Calculated 0 - 99 mg/dL 172 High         Initially on Repatha, could not afford it, currently takes Omega three, not sure if that is the only medication he is taking but his lipids continue to look moderately controlled.  We will continue to monitor annually.

## 2023-07-11 ENCOUNTER — DOCUMENTATION ONLY (OUTPATIENT)
Dept: PRIMARY CARE CLINIC | Facility: CLINIC | Age: 78
End: 2023-07-11
Payer: MEDICARE

## 2023-07-25 ENCOUNTER — PATIENT MESSAGE (OUTPATIENT)
Dept: ADMINISTRATIVE | Facility: HOSPITAL | Age: 78
End: 2023-07-25
Payer: MEDICARE

## 2023-11-27 ENCOUNTER — TELEPHONE (OUTPATIENT)
Dept: PRIMARY CARE CLINIC | Facility: CLINIC | Age: 78
End: 2023-11-27
Payer: MEDICARE

## 2023-11-27 DIAGNOSIS — B35.1 TOENAIL FUNGUS: Primary | ICD-10-CM

## 2023-11-27 NOTE — TELEPHONE ENCOUNTER
----- Message from Alina Rosario sent at 11/27/2023  9:40 AM CST -----  Regarding: advice  Type:  Needs Medical Advice    Who Called: pt wife vandana    Would the patient rather a call back or a response via [facility]?   Best Call Back Number: [phone]  Additional Information: called about needing to have labs printed for this afternoon to be picked up. Please advise

## 2023-12-08 LAB
ALBUMIN SERPL-MCNC: 4.4 G/DL (ref 3.8–4.8)
ALBUMIN/GLOB SERPL: 2.4 {RATIO} (ref 1.2–2.2)
ALP SERPL-CCNC: 77 IU/L (ref 44–121)
ALT SERPL-CCNC: 37 IU/L (ref 0–44)
AST SERPL-CCNC: 27 IU/L (ref 0–40)
BILIRUB SERPL-MCNC: 0.7 MG/DL (ref 0–1.2)
BUN SERPL-MCNC: 20 MG/DL (ref 8–27)
BUN/CREAT SERPL: 22 (ref 10–24)
CALCIUM SERPL-MCNC: 9.6 MG/DL (ref 8.6–10.2)
CHLORIDE SERPL-SCNC: 99 MMOL/L (ref 96–106)
CO2 SERPL-SCNC: 29 MMOL/L (ref 20–29)
CREAT SERPL-MCNC: 0.89 MG/DL (ref 0.76–1.27)
EST. GFR  (NO RACE VARIABLE): 89 ML/MIN/1.73
GLOBULIN SER CALC-MCNC: 1.8 G/DL (ref 1.5–4.5)
GLUCOSE SERPL-MCNC: 115 MG/DL (ref 70–99)
HBA1C MFR BLD: 6 % (ref 4.8–5.6)
POTASSIUM SERPL-SCNC: 4.6 MMOL/L (ref 3.5–5.2)
PROT SERPL-MCNC: 6.2 G/DL (ref 6–8.5)
SODIUM SERPL-SCNC: 139 MMOL/L (ref 134–144)

## 2023-12-20 ENCOUNTER — OFFICE VISIT (OUTPATIENT)
Dept: PRIMARY CARE CLINIC | Facility: CLINIC | Age: 78
End: 2023-12-20
Payer: MEDICARE

## 2023-12-20 VITALS
BODY MASS INDEX: 25.48 KG/M2 | HEART RATE: 71 BPM | DIASTOLIC BLOOD PRESSURE: 85 MMHG | OXYGEN SATURATION: 100 % | SYSTOLIC BLOOD PRESSURE: 120 MMHG | WEIGHT: 178 LBS | RESPIRATION RATE: 18 BRPM | HEIGHT: 70 IN

## 2023-12-20 DIAGNOSIS — E78.5 DYSLIPIDEMIA: Chronic | ICD-10-CM

## 2023-12-20 DIAGNOSIS — I48.11 LONGSTANDING PERSISTENT ATRIAL FIBRILLATION: Chronic | ICD-10-CM

## 2023-12-20 DIAGNOSIS — R73.03 PREDIABETES: Chronic | ICD-10-CM

## 2023-12-20 DIAGNOSIS — Z12.5 SCREENING FOR PROSTATE CANCER: ICD-10-CM

## 2023-12-20 DIAGNOSIS — F41.1 GAD (GENERALIZED ANXIETY DISORDER): Chronic | ICD-10-CM

## 2023-12-20 DIAGNOSIS — B35.1 ONYCHOMYCOSIS: ICD-10-CM

## 2023-12-20 DIAGNOSIS — I10 PRIMARY HYPERTENSION: Primary | Chronic | ICD-10-CM

## 2023-12-20 DIAGNOSIS — Z00.00 WELLNESS EXAMINATION: ICD-10-CM

## 2023-12-20 PROCEDURE — 99214 PR OFFICE/OUTPT VISIT, EST, LEVL IV, 30-39 MIN: ICD-10-PCS | Mod: ,,, | Performed by: GENERAL PRACTICE

## 2023-12-20 PROCEDURE — 1159F PR MEDICATION LIST DOCUMENTED IN MEDICAL RECORD: ICD-10-PCS | Mod: CPTII,,, | Performed by: GENERAL PRACTICE

## 2023-12-20 PROCEDURE — 3074F PR MOST RECENT SYSTOLIC BLOOD PRESSURE < 130 MM HG: ICD-10-PCS | Mod: CPTII,,, | Performed by: GENERAL PRACTICE

## 2023-12-20 PROCEDURE — 3074F SYST BP LT 130 MM HG: CPT | Mod: CPTII,,, | Performed by: GENERAL PRACTICE

## 2023-12-20 PROCEDURE — 99214 OFFICE O/P EST MOD 30 MIN: CPT | Mod: ,,, | Performed by: GENERAL PRACTICE

## 2023-12-20 PROCEDURE — 3079F PR MOST RECENT DIASTOLIC BLOOD PRESSURE 80-89 MM HG: ICD-10-PCS | Mod: CPTII,,, | Performed by: GENERAL PRACTICE

## 2023-12-20 PROCEDURE — 3079F DIAST BP 80-89 MM HG: CPT | Mod: CPTII,,, | Performed by: GENERAL PRACTICE

## 2023-12-20 PROCEDURE — 1160F PR REVIEW ALL MEDS BY PRESCRIBER/CLIN PHARMACIST DOCUMENTED: ICD-10-PCS | Mod: CPTII,,, | Performed by: GENERAL PRACTICE

## 2023-12-20 PROCEDURE — 1160F RVW MEDS BY RX/DR IN RCRD: CPT | Mod: CPTII,,, | Performed by: GENERAL PRACTICE

## 2023-12-20 PROCEDURE — 1159F MED LIST DOCD IN RCRD: CPT | Mod: CPTII,,, | Performed by: GENERAL PRACTICE

## 2023-12-20 RX ORDER — TERBINAFINE HYDROCHLORIDE 250 MG/1
250 TABLET ORAL DAILY
Qty: 90 TABLET | Refills: 0 | Status: SHIPPED | OUTPATIENT
Start: 2023-12-20 | End: 2024-03-19

## 2023-12-20 RX ORDER — TELMISARTAN 20 MG/1
20 TABLET ORAL DAILY
Qty: 90 TABLET | Refills: 3 | Status: SHIPPED | OUTPATIENT
Start: 2023-12-20 | End: 2024-12-19

## 2023-12-20 NOTE — ASSESSMENT & PLAN NOTE
Component Ref Range & Units 11 d ago 6 mo ago   Hemoglobin A1c 4.8 - 5.6 % 6.0 High  6.3 High  CM        Most recent hemoglobin A1c continues to be in or below the prediabetes range.  Continue current treatment plan diet, lifestyle modification.  While it is less than optimal for blood sugars to remain in the prediabetes range, it is essential that blood sugars do not rise to the point of becoming a type II diabetic.  We will continue to monitor closely.

## 2023-12-20 NOTE — PROGRESS NOTES
"Subjective:       Patient ID: Naveen Myles is a 78 y.o. male.    Chief Complaint: Diabetes and Follow-up    Patient presents to the clinic today for chronic condition follow-up.  Doing well, no specific complaints, tolerating all medications, reporting no significant side effects or problems.    Last wellness exam was 06/20/2023.    Chronic conditions being treated include but are not limited to hypertension, dyslipidemia, persistent atrial fibrillation.        Review of Systems   Constitutional: Negative.  Negative for fatigue and fever.   HENT: Negative.  Negative for sore throat and trouble swallowing.    Eyes: Negative.  Negative for redness and visual disturbance.   Respiratory: Negative.  Negative for chest tightness and shortness of breath.    Cardiovascular: Negative.  Negative for chest pain.   Gastrointestinal: Negative.  Negative for abdominal pain, blood in stool and nausea.   Endocrine: Negative.  Negative for cold intolerance, heat intolerance and polyuria.   Genitourinary: Negative.    Musculoskeletal: Negative.  Negative for arthralgias, gait problem and joint swelling.   Integumentary:  Negative for rash. Negative.   Neurological: Negative.  Negative for dizziness, weakness and headaches.   Psychiatric/Behavioral: Negative.  Negative for agitation and dysphoric mood.            Patient Care Team:  Ross Solares MD as PCP - General (Family Medicine)  Reid Sen MD as Consulting Physician (Cardiology)  Objective:   Visit Vitals  /85   Pulse 71   Resp 18   Ht 5' 10" (1.778 m)   Wt 80.7 kg (178 lb)   SpO2 100%   BMI 25.54 kg/m²        Physical Exam  Constitutional:       Appearance: Normal appearance.   HENT:      Head: Normocephalic.      Mouth/Throat:      Mouth: Mucous membranes are moist.      Pharynx: Oropharynx is clear.   Eyes:      Conjunctiva/sclera: Conjunctivae normal.      Pupils: Pupils are equal, round, and reactive to light.   Cardiovascular:      Rate and Rhythm: Normal " rate and regular rhythm.      Heart sounds: Normal heart sounds.   Pulmonary:      Effort: Pulmonary effort is normal.      Breath sounds: Normal breath sounds.   Abdominal:      General: Abdomen is flat.      Palpations: Abdomen is soft.   Musculoskeletal:         General: Normal range of motion.      Cervical back: Neck supple.   Skin:     General: Skin is warm and dry.   Neurological:      General: No focal deficit present.      Mental Status: He is alert and oriented to person, place, and time.   Psychiatric:         Mood and Affect: Mood normal.         Behavior: Behavior normal.         Thought Content: Thought content normal.         Judgment: Judgment normal.           Lab Results   Component Value Date     (H) 12/08/2023     12/08/2023    K 4.6 12/08/2023    CL 99 12/08/2023    CO2 29 12/08/2023    BUN 20 12/08/2023    CREATININE 0.89 12/08/2023    CALCIUM 9.6 12/08/2023    ALBUMIN 4.4 12/08/2023    BILITOT 0.7 12/08/2023    AST 27 12/08/2023    ALT 37 12/08/2023    ANIONGAP 6 (L) 01/01/2021    ESTGFRAFRICA 110 01/01/2021    EGFRNORACEVR 89 12/08/2023     Lab Results   Component Value Date    WBC 2.6 (L) 05/25/2023    RBC 4.92 05/25/2023    HGB 15.1 05/25/2023    HCT 46.6 05/25/2023    MCV 95 05/25/2023    RDW 14.6 05/25/2023     (L) 05/25/2023      Lab Results   Component Value Date    CHOL 257 (H) 05/25/2023    TRIG 61 05/25/2023    HDL 75 05/25/2023     Lab Results   Component Value Date    TSH 1.820 05/25/2023     Lab Results   Component Value Date    HGBA1C 6.0 (H) 12/08/2023    ESTIMATEDAVG 134 05/25/2023       Assessment:       ICD-10-CM ICD-9-CM   1. Primary hypertension  I10 401.9   2. Prediabetes  R73.03 790.29   3. Dyslipidemia  E78.5 272.4   4. Longstanding persistent atrial fibrillation  I48.11 427.31   5. DARRELL (generalized anxiety disorder)  F41.1 300.02   6. Wellness examination  Z00.00 V70.0   7. Screening for prostate cancer  Z12.5 V76.44   8. Onychomycosis  B35.1 110.1        Current Outpatient Medications   Medication Instructions    ASCORBATE CALCIUM, VITAMIN C, ORAL Oral    biotin 10 mg Tab Oral    busPIRone (BUSPAR) 10 mg, Oral, 2 times daily    coenzyme Q10 200 mg, Oral    cyanocobalamin 500 MCG tablet 1 tablet, Oral, Every morning    FIBER, HERBAL, ORAL Oral    multivitamin capsule 1 capsule, Oral, Daily    omega 3-dha-epa-fish oil 1,600-500-800 mg/5 mL Liqd Oral    potassium gluconate 2.5 mEq Tab Oral    telmisartan (MICARDIS) 20 mg, Oral, Daily    terbinafine HCL (LAMISIL) 250 mg, Oral, Daily    triamcinolone acetonide 0.1% (KENALOG) 0.1 % cream Topical (Top), 2 times daily    vitamin E 100 Units, Oral    warfarin (COUMADIN) 6 MG tablet Oral    XARELTO 20 mg Tab No dose, route, or frequency recorded.     Plan:     Problem List Items Addressed This Visit          Psychiatric    DARRELL (generalized anxiety disorder) (Chronic)    Overview     Prescribed buspirone 10 mg b.i.d..         Current Assessment & Plan     Patient reports stability of moods, and effectiveness of medications, including no agitation, significant somnolence, or significant bouts of anxiety or depression.  Patient is not having any sleep disturbance.  Current treatment plan will continue, with plans to discuss any significant changes in patient's status if necessary if anything changes in the future.            Derm    Onychomycosis (Chronic)    Overview     Requested medication for onychomycosis, prescribed Lamisil 250 mg daily for 90 days.  No prior history of liver issues.         Relevant Medications    terbinafine HCL (LAMISIL) 250 mg tablet       Cardiac/Vascular    Primary hypertension - Primary (Chronic)    Overview     Prescribed valsartan 40 mg daily.         Current Assessment & Plan     Blood pressures appear to be adequately controlled with current treatment plan, including prescription blood pressure medication. Continue medical management and continue home blood pressure checks.  Blood pressure  should be checked as discussed during medical visits, and average blood pressure should be no greater than 130/80.         Relevant Medications    telmisartan (MICARDIS) 20 MG Tab    Other Relevant Orders    Comprehensive Metabolic Panel    TSH    Longstanding persistent atrial fibrillation (Chronic)    Overview     Prescribed Xarelto 20 mg.         Current Assessment & Plan     Medical condition is currently stable, continue current treatment plan.         Relevant Orders    CBC Auto Differential    Dyslipidemia (Chronic)    Overview     Takes Omega three, statin intolerant due to myalgia.         Component Ref Range & Units 6 mo ago   Cholesterol 100 - 199 mg/dL 257 High    Triglycerides 0 - 149 mg/dL 61   HDL >39 mg/dL 75   VLDL Cholesterol Miguel 5 - 40 mg/dL 10   LDL Calculated 0 - 99 mg/dL 172 High       Not very well controlled but there are essentially no other options, was on Repatha but can not afford it.  Continue current treatment         Relevant Orders    Lipid Panel       Endocrine    Prediabetes (Chronic)    Overview     Not prescribed any medication for prediabetes.         Current Assessment & Plan           Component Ref Range & Units 11 d ago 6 mo ago   Hemoglobin A1c 4.8 - 5.6 % 6.0 High  6.3 High  CM      Most recent hemoglobin A1c continues to be in or below the prediabetes range.  Continue current treatment plan diet, lifestyle modification.  While it is less than optimal for blood sugars to remain in the prediabetes range, it is essential that blood sugars do not rise to the point of becoming a type II diabetic.  We will continue to monitor closely.         Relevant Orders    Hemoglobin A1C     Other Visit Diagnoses       Wellness examination        This diagnosis does not apply to this visit, wellness exam with pre visit labs will be scheduled/ordered for future visit.    Screening for prostate cancer        This diagnosis does not apply to this visit, appropriate prostate cancer screening  will be ordered for next visit/wellness exam.    Relevant Orders    PSA, Screening                    Follow up in about 6 months (around 6/24/2024) for Medicare Wellness.

## 2024-06-18 ENCOUNTER — TELEPHONE (OUTPATIENT)
Dept: PRIMARY CARE CLINIC | Facility: CLINIC | Age: 79
End: 2024-06-18
Payer: MEDICARE

## 2024-06-18 NOTE — TELEPHONE ENCOUNTER
----- Message from Gianluca Savage sent at 6/18/2024 10:27 AM CDT -----  .Who Called: Angelica    Caller is requesting assistance/information from provider's office.    Symptoms (please be specific): n/a   How long has patient had these symptoms:  a/a  List of preferred pharmacies on file (remove unneeded): [unfilled]  If different, enter pharmacy into here including location and phone number: n/a      Preferred Method of Contact: Phone Call  Patient's Preferred Phone Number on File: [phone]   Best Call Back Number, if different:  Additional Information: called wanting to discuss lab orders

## 2024-06-19 ENCOUNTER — TELEPHONE (OUTPATIENT)
Dept: PRIMARY CARE CLINIC | Facility: CLINIC | Age: 79
End: 2024-06-19
Payer: MEDICARE

## 2024-06-19 LAB
ALBUMIN SERPL-MCNC: 4.4 G/DL (ref 3.8–4.8)
ALP SERPL-CCNC: 76 IU/L (ref 44–121)
ALT SERPL-CCNC: 30 IU/L (ref 0–44)
AST SERPL-CCNC: 28 IU/L (ref 0–40)
BASOPHILS # BLD AUTO: 0 X10E3/UL (ref 0–0.2)
BASOPHILS NFR BLD AUTO: 1 %
BILIRUB SERPL-MCNC: 0.6 MG/DL (ref 0–1.2)
BUN SERPL-MCNC: 19 MG/DL (ref 8–27)
BUN/CREAT SERPL: 21 (ref 10–24)
CALCIUM SERPL-MCNC: 9.6 MG/DL (ref 8.6–10.2)
CHLORIDE SERPL-SCNC: 101 MMOL/L (ref 96–106)
CHOLEST SERPL-MCNC: 199 MG/DL (ref 100–199)
CO2 SERPL-SCNC: 27 MMOL/L (ref 20–29)
CREAT SERPL-MCNC: 0.92 MG/DL (ref 0.76–1.27)
EOSINOPHIL # BLD AUTO: 0.1 X10E3/UL (ref 0–0.4)
EOSINOPHIL NFR BLD AUTO: 3 %
ERYTHROCYTE [DISTWIDTH] IN BLOOD BY AUTOMATED COUNT: 13.5 % (ref 11.6–15.4)
EST. GFR  (NO RACE VARIABLE): 86 ML/MIN/1.73
GLOBULIN SER CALC-MCNC: 2.1 G/DL (ref 1.5–4.5)
GLUCOSE SERPL-MCNC: 116 MG/DL (ref 70–99)
HBA1C MFR BLD: 6 % (ref 4.8–5.6)
HCT VFR BLD AUTO: 45.8 % (ref 37.5–51)
HDLC SERPL-MCNC: 73 MG/DL
HGB BLD-MCNC: 15.3 G/DL (ref 13–17.7)
LDLC SERPL CALC-MCNC: 114 MG/DL (ref 0–99)
LYMPHOCYTES # BLD AUTO: 1 X10E3/UL (ref 0.7–3.1)
LYMPHOCYTES NFR BLD AUTO: 35 %
MCH RBC QN AUTO: 31.9 PG (ref 26.6–33)
MCHC RBC AUTO-ENTMCNC: 33.4 G/DL (ref 31.5–35.7)
MCV RBC AUTO: 96 FL (ref 79–97)
MONOCYTES # BLD AUTO: 0.3 X10E3/UL (ref 0.1–0.9)
MONOCYTES NFR BLD AUTO: 10 %
NEUTROPHILS # BLD AUTO: 1.5 X10E3/UL (ref 1.4–7)
NEUTROPHILS NFR BLD AUTO: 51 %
PLATELET # BLD AUTO: 118 X10E3/UL (ref 150–450)
POTASSIUM SERPL-SCNC: 5.5 MMOL/L (ref 3.5–5.2)
PROT SERPL-MCNC: 6.5 G/DL (ref 6–8.5)
PSA SERPL-MCNC: 0.1 NG/ML (ref 0–4)
RBC # BLD AUTO: 4.79 X10E6/UL (ref 4.14–5.8)
SODIUM SERPL-SCNC: 142 MMOL/L (ref 134–144)
SPECIMEN STATUS REPORT: NORMAL
TRIGL SERPL-MCNC: 67 MG/DL (ref 0–149)
TSH SERPL DL<=0.005 MIU/L-ACNC: 2.62 UIU/ML (ref 0.45–4.5)
VLDLC SERPL CALC-MCNC: 12 MG/DL (ref 5–40)
WBC # BLD AUTO: 2.9 X10E3/UL (ref 3.4–10.8)

## 2024-06-19 NOTE — TELEPHONE ENCOUNTER
Pre Visit Call    Pt has visit on ....06/24  Did pt confirm appointment? no  Did pt answer or left vm on mobile number? Left vm  Wellness labs done? No ( [facility])

## 2024-06-19 NOTE — TELEPHONE ENCOUNTER
----- Message from Beth Abbasi sent at 6/19/2024 11:57 AM CDT -----  Regarding: Orders  .Caller is requesting to schedule their Lab appointment prior to annual appointment.    Name of Caller:Marcia Lemos    Preferred Date and Time of Labs:    Date of E[facility] Appointment:    Where would they like the lab performed? [facility]    Would the patient rather a call back or a response via [facility]?     Best Call Back Number:    Additional Information:Marcia whitehead [facility] calling for orders. Pt. Is in office. Fax #: [phone]

## 2024-06-21 ENCOUNTER — TELEPHONE (OUTPATIENT)
Dept: PRIMARY CARE CLINIC | Facility: CLINIC | Age: 79
End: 2024-06-21
Payer: MEDICARE

## 2024-06-21 NOTE — TELEPHONE ENCOUNTER
----- Message from Karla Greenwood sent at 6/21/2024 10:25 AM CDT -----  Regarding: ref # [id number]  .Type:  Needs Medical Advice    Who Called: [facility]  Symptoms (please be specific):    How long has patient had these symptoms:    Pharmacy name and phone #:    Would the patient rather a call back or a response via [facility]? cb  Best Call Back Number:   Additional Information: [phone]   [id number] needed

## 2024-06-27 RX ORDER — EZETIMIBE 10 MG/1
10 TABLET ORAL DAILY
COMMUNITY
Start: 2024-05-22

## 2024-07-03 ENCOUNTER — OFFICE VISIT (OUTPATIENT)
Dept: PRIMARY CARE CLINIC | Facility: CLINIC | Age: 79
End: 2024-07-03
Payer: MEDICARE

## 2024-07-03 VITALS
RESPIRATION RATE: 18 BRPM | SYSTOLIC BLOOD PRESSURE: 136 MMHG | OXYGEN SATURATION: 98 % | HEIGHT: 70 IN | DIASTOLIC BLOOD PRESSURE: 88 MMHG | HEART RATE: 66 BPM | BODY MASS INDEX: 25.48 KG/M2 | WEIGHT: 178 LBS

## 2024-07-03 DIAGNOSIS — T46.6X5A MYALGIA DUE TO STATIN: Chronic | ICD-10-CM

## 2024-07-03 DIAGNOSIS — F41.1 GAD (GENERALIZED ANXIETY DISORDER): Chronic | ICD-10-CM

## 2024-07-03 DIAGNOSIS — D69.6 THROMBOCYTOPENIA: Chronic | ICD-10-CM

## 2024-07-03 DIAGNOSIS — I48.11 LONGSTANDING PERSISTENT ATRIAL FIBRILLATION: Chronic | ICD-10-CM

## 2024-07-03 DIAGNOSIS — Z00.00 MEDICARE ANNUAL WELLNESS VISIT, SUBSEQUENT: Primary | ICD-10-CM

## 2024-07-03 DIAGNOSIS — R73.03 PREDIABETES: Chronic | ICD-10-CM

## 2024-07-03 DIAGNOSIS — E78.5 DYSLIPIDEMIA: Chronic | ICD-10-CM

## 2024-07-03 DIAGNOSIS — Z12.5 SCREENING FOR PROSTATE CANCER: ICD-10-CM

## 2024-07-03 DIAGNOSIS — I10 PRIMARY HYPERTENSION: Chronic | ICD-10-CM

## 2024-07-03 DIAGNOSIS — D70.0 CONGENITAL NEUTROPENIA: Chronic | ICD-10-CM

## 2024-07-03 DIAGNOSIS — M79.10 MYALGIA DUE TO STATIN: Chronic | ICD-10-CM

## 2024-07-03 RX ORDER — BUSPIRONE HYDROCHLORIDE 10 MG/1
10 TABLET ORAL 2 TIMES DAILY
Qty: 180 TABLET | Refills: 3 | Status: SHIPPED | OUTPATIENT
Start: 2024-07-03

## 2024-07-03 RX ORDER — CLINDAMYCIN HYDROCHLORIDE 150 MG/1
150 CAPSULE ORAL
COMMUNITY
Start: 2024-06-26

## 2024-07-03 RX ORDER — TELMISARTAN 20 MG/1
20 TABLET ORAL DAILY
Qty: 90 TABLET | Refills: 3 | Status: SHIPPED | OUTPATIENT
Start: 2024-07-03 | End: 2025-07-03

## 2024-07-03 NOTE — ASSESSMENT & PLAN NOTE
Component Ref Range & Units 13 d ago 6 mo ago 1 yr ago   Hemoglobin A1c 4.8 - 5.6 % 6.0 High  6.0 High  CM 6.3 High  CM

## 2024-07-03 NOTE — PROGRESS NOTES
Patient ID: [id number]     Chief Complaint: Annual Exam      HPI:     Naveen Myles is a 78 y.o. male here today for a Medicare Wellness. No other complaints today.       -------------------------------------    Diabetes mellitus, type 2    Hyperlipidemia    Hypertension        Past Surgical History:   Procedure Laterality Date    HAND SURGERY         Review of patient's allergies indicates:   Allergen Reactions    Penicillins Hives     Other reaction(s): hives    Statins-hmg-coa reductase inhibitors      myalgia       Outpatient Medications Marked as Taking for the 7/3/24 encounter (Office Visit) with Ross Solares MD   Medication Sig Dispense Refill    clindamycin (CLEOCIN) 150 MG capsule Take 150 mg by mouth.      ezetimibe (ZETIA) 10 mg tablet Take 10 mg by mouth once daily.         Social History     Socioeconomic History    Marital status:    Tobacco Use    Smoking status: Never    Smokeless tobacco: Never   Substance and Sexual Activity    Alcohol use: Not Currently    Drug use: Never    Sexual activity: Yes        No family history on file.     Patient Care Team:  Ross Solares MD as PCP - General (Family Medicine)  Reid Sen MD as Consulting Physician (Cardiology)     Opioid Screening: Patient medication list reviewed, patient is not taking prescription opioids. Patient is not using additional opioids than prescribed. Patient is at low risk of substance abuse based on this opioid use history.       Subjective:     Review of Systems   Constitutional: Negative.  Negative for malaise/fatigue and weight loss.   HENT: Negative.     Eyes: Negative.    Respiratory: Negative.  Negative for shortness of breath.    Cardiovascular: Negative.  Negative for chest pain.   Gastrointestinal: Negative.    Genitourinary: Negative.    Musculoskeletal: Negative.    Skin: Negative.    Neurological: Negative.  Negative for focal weakness, weakness and headaches.   Endo/Heme/Allergies: Negative.     Psychiatric/Behavioral: Negative.  Negative for depression and memory loss. The patient is not nervous/anxious.          Patient Reported Health Risk Assessment  What [age] 70-79  Are you male or female?: Male  During the past four weeks, how much have you been bothered by emotional problems such as feeling anxious, depressed, irritable, sad, or downhearted and blue?: Not at all  During the past five weeks, has your physical and/or emotional health limited your social activities with family, friends, neighbors, or groups?: Not at all  During the past four weeks, how much bodily pain have you generally had?: No pain  During the past four weeks, was someone available to help if you needed and wanted help?: Yes, as much as I wanted  During the past four weeks, what was the hardest physical activity you could do for at least two minutes?: Light  Can you get to places out of walking distance without help?  (For example, can you travel alone on buses or taxis, or drive your own car?): Yes  Can you go shopping for groceries or clothes without someone's help?: Yes  Can you prepare your own meals?: Yes  Can you do your own housework without help?: Yes  Because of any health problems, do you need the help of another person with your personal care needs such as eating, bathing, dressing, or getting around the house?: No  Can you handle your own money without help?: Yes  During the past four weeks, how would you rate your health in general?: Good  How have things been going for you during the past four weeks?: Pretty well  Are you having difficulties driving your car?: No  Do you always fasten your seat belt when you are in a car?: Yes, usually  How often in the past four weeks have you been bothered by falling or dizzy when standing up?: Never  How often in the past four weeks have you been bothered by sexual problems?: Never  How often in the past four weeks have you been bothered by trouble eating well?: Never  How  "often in the past four weeks have you been bothered by teeth or denture problems?: Never  How often in the past four weeks have you been bothered with problems using the telephone?: Never  How often in the past four weeks have you been bothered by tiredness or fatigue?: Never  Have you fallen two or more times in the past year?: No  Are you afraid of falling?: No  Are you a smoker?: No  During the past four weeks, how many drinks of wine, beer, or other alcoholic beverages did you have?: One drink or less per week  Do you exercise for about 20 minutes three or more days a week?: No, I usually do not exercise this much  Have you been given any information to help you with hazards in your house that might hurt you?: No  Have you been given any information to help you with keeping track of your medications?: No  How often do you have trouble taking medicines the way you've been told to take them?: I always take them as prescribed  How confident are you that you can control and manage most of your health problems?: Very confident    Objective:     /88   Pulse 66   Resp 18   Ht 5' 10" (1.778 m)   Wt 80.7 kg (178 lb)   SpO2 98%   BMI 25.54 kg/m²     Physical Exam  Constitutional:       Appearance: Normal appearance.   HENT:      Head: Normocephalic.      Mouth/Throat:      Mouth: Mucous membranes are moist.      Pharynx: Oropharynx is clear.   Eyes:      Conjunctiva/sclera: Conjunctivae normal.      Pupils: Pupils are equal, round, and reactive to light.   Cardiovascular:      Rate and Rhythm: Normal rate. Rhythm irregular.      Heart sounds: Normal heart sounds.   Pulmonary:      Effort: Pulmonary effort is normal.      Breath sounds: Normal breath sounds.   Abdominal:      General: Abdomen is flat.      Palpations: Abdomen is soft.   Musculoskeletal:         General: Normal range of motion.      Cervical back: Neck supple.   Skin:     General: Skin is warm and dry.   Neurological:      General: No focal " deficit present.      Mental Status: He is alert and oriented to person, place, and time.   Psychiatric:         Mood and Affect: Mood normal.         Behavior: Behavior normal.         Thought Content: Thought content normal.         Judgment: Judgment normal.         Lab Results   Component Value Date     (H) 06/19/2024     06/19/2024    K 5.5 (H) 06/19/2024     06/19/2024    CO2 27 06/19/2024    BUN 19 06/19/2024    CREATININE 0.92 06/19/2024    CALCIUM 9.6 06/19/2024    ALBUMIN 4.4 06/19/2024    BILITOT 0.6 06/19/2024    AST 28 06/19/2024    ALT 30 06/19/2024    ANIONGAP 6 (L) 01/01/2021    ESTGFRAFRICA 110 01/01/2021    EGFRNORACEVR 86 06/19/2024     Lab Results   Component Value Date    WBC 2.9 (L) 06/19/2024    RBC 4.79 06/19/2024    HGB 15.3 06/19/2024    HCT 45.8 06/19/2024    MCV 96 06/19/2024    RDW 13.5 06/19/2024     (L) 06/19/2024      Lab Results   Component Value Date    CHOL 199 06/19/2024    TRIG 67 06/19/2024    HDL 73 06/19/2024     Lab Results   Component Value Date    TSH 2.620 06/19/2024     Lab Results   Component Value Date    HGBA1C 6.0 (H) 06/19/2024    ESTIMATEDAVG 134 05/25/2023      omponent Ref Range & Units 13 d ago 1 yr ago   PSA - [facility] 0.0 - 4.0 ng/mL 0.1 0.2 CM              No data to display                  7/3/2024     4:00 PM 5/5/2022     1:00 PM   Fall Risk Assessment - Outpatient   Mobility Status Ambulatory Ambulatory   Number of falls 0 0   Identified as fall risk False False           Depression Screening  Over the past two weeks, has the patient felt down, depressed, or hopeless?: No  Over the past two weeks, has the patient felt little interest or pleasure in doing things?: No  Functional Ability/Safety Screening  Was the patient's timed Up & Go test unsteady or longer than 30 seconds?: No  Does the patient need help with phone, transportation, shopping, preparing meals, housework, laundry, meds, or managing money?: No  Does the patient's  home have rugs in the hallway, lack grab bars in the bathroom, lack handrails on the stairs or have poor lighting?: No  Have you noticed any hearing difficulties?: No  Cognitive Function (Assessed through direct observation with due consideration of information obtained by way of patient reports and/or concerns raised by family, friends, caretakers, or others)    Does the patient repeat questions/statements in the same day?: No  Does the patient have trouble remembering the date, year, and time?: No  Does the patient have difficulty managing finances?: No  Does the patient have a decreased sense of direction?: No  Assessment:       ICD-10-CM ICD-9-CM   1. Medicare annual wellness visit, subsequent  Z00.00 V70.0   2. Screening for prostate cancer  Z12.5 V76.44   3. Primary hypertension  I10 401.9   4. Dyslipidemia  E78.5 272.4   5. DARRELL (generalized anxiety disorder)  F41.1 300.02   6. Longstanding persistent atrial fibrillation  I48.11 427.31   7. Congenital neutropenia  D70.0 288.01   8. Thrombocytopenia  D69.6 287.5   9. Myalgia due to statin  M79.10 729.1    T46.6X5A E942.2   10. Prediabetes  R73.03 790.29        Plan:     Medicare Annual Wellness and Personalized Prevention Plan:   Fall Risk + Home Safety + Hearing Impairment + Depression Screen + Cognitive Impairment Screen + Health Risk Assessment all reviewed.       Health Maintenance Topics with due status: Not Due       Topic Last Completion Date    TETANUS VACCINE 08/27/2023    Influenza Vaccine 08/27/2023    Hemoglobin A1c (Prediabetes) 06/19/2024    Lipid Panel 06/19/2024      The patient's Health Maintenance was reviewed and the following appears to be due at this time:   There are no preventive care reminders to display for this patient.    Health risk assessment:  After review of the patient's medical record as it relates to health risk assessment over the next 5-10 years per recommendations of the [place]PSTF, it was determined that all pertinent  recommendations have been appropriately addressed. The patient does not desire any further preventative care measures or screening tests at this time.  We will continue to reassess at each annual visit.    1. Medicare annual wellness visit, subsequent  Comments:  Overall health status was reviewed.  Good health habits reinforced.  Annual wellness exams recommended.    2. Screening for prostate cancer  Comments:  Prostate specific antigen blood test obtained was essentially normal, suggesting that there is no current concern for prostate cancer.  Digital exam deferred.    3. Primary hypertension  Overview:  Prescribed telmisartan 20 mg daily.    Blood pressures appear to be adequately controlled with current treatment plan, including prescription blood pressure medication.  Medication(s) appear to be effective at current dosages, and are not causing any side effects or problems.  Continue medical management and continue home blood pressure checks.  Average blood pressures at home should be no greater than 130/80.  Patient instructed to contact the clinic if blood pressures become elevated at any point prior to next clinic visit.    Medication will be continued at the current dosage.    Orders:  -     telmisartan (MICARDIS) 20 MG Tab; Take 1 tablet (20 mg total) by mouth once daily.  Dispense: 90 tablet; Refill: 3    4. Dyslipidemia  Overview:  Prescribed Zetia 10 mg daily, and takes Omega three fish oil, statin intolerant due to myalgia.    Dyslipidemia is being treated using lifestyle modification only along with Omega 3.  Patient is not being prescribed lipid-lowering medication.  As discussed, we will continue to monitor this, and may ultimately choose to prescribe medication if this becomes difficult to control utilizing lifestyle modification only.    Assessment & Plan:          Component Ref Range & Units 13 d ago 1 yr ago   Cholesterol 100 - 199 mg/dL 199 257 High    Triglycerides 0 - 149 mg/dL 67 61   HDL >39  mg/dL 73 75   VLDL Cholesterol Miguel 5 - 40 mg/dL 12 10   LDL Calculated 0 - 99 mg/dL 114 High  172 High             5. DARRELL (generalized anxiety disorder)  Overview:  Prescribed buspirone 10 mg b.i.d..    Patient reports stability of moods, and effectiveness of medications, including no agitation, significant somnolence, or significant bouts of anxiety or depression.  Patient is not having any sleep disturbance.  Current treatment plan will continue, with plans to discuss any significant changes in patient's status if necessary if anything changes in the future..    Medication will be continued at current dosage.      6. Longstanding persistent atrial fibrillation  Overview:  Prescribed Coumadin 9 mg daily.    This medical condition is currently stable on prescription medication, continue current treatment plan.      7. Congenital neutropenia  Overview:  Due to multiple previous white blood cell counts that have been below normal, without obvious clinical findings or diagnostic reasons, the patient has been diagnosed with benign neutropenia, more than likely congenital in nature.  On occasion, white blood cell counts may be normal, but most previous counts have been lower than normal.  We will continue to monitor closely but there are no significant clinical concerns at this time.    Assessment & Plan:          Component Ref Range & Units 13 d ago  (6/19/24) 1 yr ago  (5/25/23) 3 yr ago  (12/15/20) 3 yr ago  (12/9/20)   WBC 3.4 - 10.8 x10E3/uL 2.9 Low  2.6 Low   3.0 Low  R   RBC 4.14 - 5.80 x10E6/uL 4.79 4.92  4.66 Low  R   Hemoglobin 13.0 - 17.7 g/dL 15.3 15.1 13.5 Low  R 14.3 R   Hematocrit 37.5 - 51.0 % 45.8 46.6 41.0 Low  R 44.2 R   MCV 79 - 97 fL 96 95  94.8 High  R   MCH 26.6 - 33.0 pg 31.9 30.7  30.7 R   MCHC 31.5 - 35.7 g/dL 33.4 32.4  32.4 Low  R   RDW 11.6 - 15.4 % 13.5 14.6  14.6 R   Platelets 150 - 450 x10E3/uL 118 Low  115 Low   111 Low  R            8. Thrombocytopenia  Overview:  On several instances,  the patient has had platelet counts below normal.  This does appear to be mild and stable.  No significant clinical symptoms, more than likely this is benign, however we will continue to monitor on a regular basis and evaluate further if it worsens significantly.  This may include a Hematology Oncology referral.    Assessment & Plan:          Component Ref Range & Units 13 d ago  (6/19/24) 1 yr ago  (5/25/23) 3 yr ago  (12/15/20) 3 yr ago  (12/9/20)   WBC 3.4 - 10.8 x10E3/uL 2.9 Low  2.6 Low   3.0 Low  R   RBC 4.14 - 5.80 x10E6/uL 4.79 4.92  4.66 Low  R   Hemoglobin 13.0 - 17.7 g/dL 15.3 15.1 13.5 Low  R 14.3 R   Hematocrit 37.5 - 51.0 % 45.8 46.6 41.0 Low  R 44.2 R   MCV 79 - 97 fL 96 95  94.8 High  R   MCH 26.6 - 33.0 pg 31.9 30.7  30.7 R   MCHC 31.5 - 35.7 g/dL 33.4 32.4  32.4 Low  R   RDW 11.6 - 15.4 % 13.5 14.6  14.6 R   Platelets 150 - 450 x10E3/uL 118 Low  115 Low   111 Low  R            9. Myalgia due to statin  Overview:  Patient prescribed statins previously, experienced significant myalgias.  For that reason, statins will not be prescribed for patient's elevated lipids.  We will attempt to use other means for adequate control of dyslipidemia, including lifestyle modification and other medications if appropriate.      10. Prediabetes  Overview:  Patient has prediabetes and is not prescribed medication.  Patient's prediabetes is treated and controlled adequately using conservative means, specifically lifestyle modification, dietary changes, and/or increase in activity/exercise.        Assessment & Plan:         Component Ref Range & Units 13 d ago 6 mo ago 1 yr ago   Hemoglobin A1c 4.8 - 5.6 % 6.0 High  6.0 High  CM 6.3 High  CM                    Advance Care Planning     Date: 07/02/2024    Living Will  During this visit, I engaged the patient  in the voluntary advance care planning process.  The patient and I reviewed the role for advance directives and their purpose in directing future healthcare if the  patient's unable to speak for him/herself.  At this point in time, the patient does have full decision-making capacity.  We discussed different extreme health states that he could experience, and reviewed what kind of medical care he would want in those situations.  The patient communicated that if he were comatose and had little chance of a meaningful recovery, he would not want machines/life-sustaining treatments used. In addition to the above preference, other important end-of-life issues for the patient include no other.  Advanced care planning paperwork given to patient to bring home and discuss with the family.  Once signed, patient should bring form back for for the purposes of entering it into the medical record.  I spent a total of five minutes engaging the patient in this advance care planning discussion.              Current Outpatient Medications   Medication Instructions    ASCORBATE CALCIUM, VITAMIN C, ORAL Oral    biotin 10 mg Tab Oral    busPIRone (BUSPAR) 10 mg, Oral, 2 times daily    clindamycin (CLEOCIN) 150 mg, Oral    coenzyme Q10 200 mg, Oral    cyanocobalamin 500 MCG tablet 1 tablet, Oral, Every morning    ezetimibe (ZETIA) 10 mg, Oral, Daily    FIBER, HERBAL, ORAL Oral    multivitamin capsule 1 capsule, Oral, Daily    omega 3-dha-epa-fish oil 1,600-500-800 mg/5 mL Liqd Oral    potassium gluconate 2.5 mEq Tab Oral    telmisartan (MICARDIS) 20 mg, Oral, Daily    triamcinolone acetonide 0.1% (KENALOG) 0.1 % cream Topical (Top), 2 times daily    vitamin E 100 Units, Oral    warfarin (COUMADIN) 6 MG tablet Oral        Follow up in about 27 weeks (around 1/8/2025) for Chronic condition F/up. In addition to their scheduled follow up, the patient has also been instructed to follow up on as needed basis.

## 2024-07-03 NOTE — ASSESSMENT & PLAN NOTE
Component Ref Range & Units 13 d ago  (6/19/24) 1 yr ago  (5/25/23) 3 yr ago  (12/15/20) 3 yr ago  (12/9/20)   WBC 3.4 - 10.8 x10E3/uL 2.9 Low  2.6 Low   3.0 Low  R   RBC 4.14 - 5.80 x10E6/uL 4.79 4.92  4.66 Low  R   Hemoglobin 13.0 - 17.7 g/dL 15.3 15.1 13.5 Low  R 14.3 R   Hematocrit 37.5 - 51.0 % 45.8 46.6 41.0 Low  R 44.2 R   MCV 79 - 97 fL 96 95  94.8 High  R   MCH 26.6 - 33.0 pg 31.9 30.7  30.7 R   MCHC 31.5 - 35.7 g/dL 33.4 32.4  32.4 Low  R   RDW 11.6 - 15.4 % 13.5 14.6  14.6 R   Platelets 150 - 450 x10E3/uL 118 Low  115 Low   111 Low  R

## 2024-11-21 NOTE — ASSESSMENT & PLAN NOTE
Component Ref Range & Units 13 d ago 1 yr ago   Cholesterol 100 - 199 mg/dL 199 257 High    Triglycerides 0 - 149 mg/dL 67 61   HDL >39 mg/dL 73 75   VLDL Cholesterol Miguel 5 - 40 mg/dL 12 10   LDL Calculated 0 - 99 mg/dL 114 High  172 High          "'STAT" lab orders placed on 11/14/24 noted for this patient.  No results noted. Contacted MYA Flores PA-C who placed orders for lab collection,. Labs collected and sent as part of pre op prep. Awaiting result at this tme.  "

## 2025-05-01 DIAGNOSIS — F41.1 GAD (GENERALIZED ANXIETY DISORDER): Chronic | ICD-10-CM

## 2025-05-01 RX ORDER — BUSPIRONE HYDROCHLORIDE 10 MG/1
10 TABLET ORAL 2 TIMES DAILY
Qty: 180 TABLET | Refills: 3 | Status: SHIPPED | OUTPATIENT
Start: 2025-05-01

## 2025-05-06 DIAGNOSIS — F41.1 GAD (GENERALIZED ANXIETY DISORDER): Chronic | ICD-10-CM

## 2025-05-06 RX ORDER — BUSPIRONE HYDROCHLORIDE 10 MG/1
10 TABLET ORAL 2 TIMES DAILY
Qty: 180 TABLET | Refills: 3 | Status: SHIPPED | OUTPATIENT
Start: 2025-05-06 | End: 2026-05-06

## 2025-06-03 DIAGNOSIS — I10 PRIMARY HYPERTENSION: Primary | Chronic | ICD-10-CM

## 2025-06-03 RX ORDER — TELMISARTAN 20 MG/1
20 TABLET ORAL DAILY
Qty: 90 TABLET | Refills: 3 | Status: SHIPPED | OUTPATIENT
Start: 2025-06-03 | End: 2026-06-03